# Patient Record
Sex: FEMALE | Race: BLACK OR AFRICAN AMERICAN
[De-identification: names, ages, dates, MRNs, and addresses within clinical notes are randomized per-mention and may not be internally consistent; named-entity substitution may affect disease eponyms.]

---

## 2017-10-05 ENCOUNTER — HOSPITAL ENCOUNTER (EMERGENCY)
Dept: HOSPITAL 17 - NEPK | Age: 27
Discharge: HOME | End: 2017-10-05
Payer: COMMERCIAL

## 2017-10-05 VITALS — BODY MASS INDEX: 41.02 KG/M2 | WEIGHT: 293 LBS | HEIGHT: 71 IN

## 2017-10-05 VITALS
TEMPERATURE: 98.4 F | RESPIRATION RATE: 16 BRPM | DIASTOLIC BLOOD PRESSURE: 97 MMHG | SYSTOLIC BLOOD PRESSURE: 143 MMHG | OXYGEN SATURATION: 97 % | HEART RATE: 105 BPM

## 2017-10-05 DIAGNOSIS — Z72.0: ICD-10-CM

## 2017-10-05 DIAGNOSIS — J20.9: Primary | ICD-10-CM

## 2017-10-05 DIAGNOSIS — H92.03: ICD-10-CM

## 2017-10-05 DIAGNOSIS — R07.89: ICD-10-CM

## 2017-10-05 PROCEDURE — 71020: CPT

## 2017-10-05 PROCEDURE — 99284 EMERGENCY DEPT VISIT MOD MDM: CPT

## 2017-10-05 PROCEDURE — 94664 DEMO&/EVAL PT USE INHALER: CPT

## 2017-10-05 NOTE — PD
HPI


Chief Complaint:  Cold / Flu Symptoms


Time Seen by Provider:  10:40


Travel History


International Travel<30 days:  No


Contact w/Intl Traveler<30days:  No


Traveled to known affect area:  No





History of Present Illness


HPI


27-year-old female presents to the emergency Department with complaint of cough

, throat irritation, nasal congestion, chest tightness since Saturday.  She was 

seen at [facility] on Monday and says she was told she had an 

upper respiratory infection and was negative for strep pharyngitis; she given 

cough medicine with codeine which is not helping her cough.  Denies fevers.  

Reports ear pain bilaterally.  Denies lump in throat, difficulty swallowing, 

unusual drooling.  Reports painful swallowing.  Reports continued consistent 

dry cough.  Denies chest pain or shortness of breath.  Says she feels short of 

breath or cough exacerbations.  Reports occasional wheezing.  Denies history of 

asthma.  Symptoms are mild in severity.    No known relieving or aggravating 

factors.   Allergies to blueberries.  Has no other medical complaints. No other 

modifying factors or associated signs and symptoms.





PFSH


Past Medical History


Diminished Hearing:  No


Pregnant?:  Not Pregnant





Social History


Alcohol Use:  No ([facility])


Tobacco Use:  Yes (1 PPD)


Substance Use:  No





Allergies-Medications


(Allergen,Severity, Reaction):  


Coded Allergies:  


     blueberry (Verified  Allergy, Severe, THROAT SWELLINGS SHUT, 10/5/17)


Reported Meds & Prescriptions





Reported Meds & Active Scripts


Active


Tessalon Perles (Benzonatate) 100 Mg Cap 100 Mg PO TID PRN


Deltasone (Prednisone) 20 Mg Tab 40 Mg PO DAILY 4 Days


     start 10/6/2017


Ventolin Hfa 18 GM Inh (Albuterol Sulfate) 90 Mcg/Act Aer 2 Puff INH Q4-6H PRN








Review of Systems


Except as stated in HPI:  all other systems reviewed are Neg





Physical Exam


Narrative


GENERAL: Well-nourished, well-developed black female patient, in no acute 

distress; afebrile, nontoxic-appearing


SKIN: Warm and dry.  No rash.


HEAD: Atraumatic. Normocephalic. 


EYES: Pupils equal and round. No scleral icterus. No injection or drainage. 


ENT: Mucosa pink and moist. No erythema or exudates. No uvular edema. No uvular

, palatal, or tonsillar deviation.  Airway patent.  


EARS: Bilateral pinnae and external canals appear within normal limits.  

Bilateral tympanic membranes without  erythema, dullness or perforation; 

bulging bilaterally. 


NECK: Trachea midline.  No lymphadenopathy.  


CARDIOVASCULAR: Regular rate and rhythm.  No murmur appreciated.  


RESPIRATORY: No accessory muscle use. Clear to auscultation. Breath sounds 

equal bilaterally.  No retractions or tachypnea.  Consistent dry cough.


GASTROINTESTINAL: Obese.


MUSCULOSKELETAL: No obvious deformities. No clubbing.  No cyanosis.  No edema. 


NEUROLOGICAL: Awake and alert.  Oriented 3.  No obvious cranial nerve 

deficits.  Motor grossly within normal limits. Normal speech.  Moves all 

extremities.  5/5 strength to all extremities.


PSYCHIATRIC: Appropriate mood and affect; insight and judgment normal.





Data


Data


Last Documented VS





Vital Signs








  Date Time  Temp Pulse Resp B/P (MAP) Pulse Ox O2 Delivery O2 Flow Rate FiO2


 


10/5/17 10:28 98.4 105 16 143/97 (112) 97   








Orders





 Orders


Chest, Pa & Lat (10/5/17 10:41)


Prednisone (Deltasone) (10/5/17 10:45)


Albuterol Neb (Albuterol Neb) (10/5/17 10:45)








MDM


Medical Decision Making


Medical Screen Exam Complete:  Yes


Emergency Medical Condition:  Yes


Medical Record Reviewed:  Yes


Differential Diagnosis


Bronchitis, pneumonia, URI


Narrative Course


27-year-old female with cough/cold symptoms since Saturday.  Was seen at [facility] on Monday and treated for viral URI with cough medicine with codeine.  

Per the patient she was swabbed for strep which was negative.  Patient is 

afebrile and nontoxic appearing.  She denies fever, vomiting.  She is in no 

acute distress without retractions or tachypnea.  Patient has consistent dry 

cough during physical exam.  Suspecting bronchitis.  Chest x-ray, albuterol 

nebulizer, Deltasone ordered.


1109:  Chest x-ray no acute disease.  Findings discussed with patient.


1139:  On reexamination after nebulizer the patient reports improvement in 

symptoms.  Denies chest tightness or shortness of breath.  Ventolin inhaler, 

Tessalon Perles, Deltasone prescribed for home.  Instructed patient to follow 

up with primary care provider.  Patient verbalizes understanding and agreement 

with treatment plan.  Patient is medically cleared and stable for discharge.  

Discussed reasons to return to the emergency department.  Patient agrees with 

treatment plan.  The patients vital signs are stable and the patient is stable 

for outpatient follow-up and treatment.  Patient discharged home, stable and in 

no acute distress.





Diagnosis





 Primary Impression:  


 Acute bronchitis


 Qualified Codes:  J20.9 - Acute bronchitis, unspecified


Referrals:  


Primary Care Physician


Patient Instructions:  Acute Bronchitis (ED), General Instructions


Departure Forms:  Tests/Procedures, Work Release   Enter return to work date:  

Oct 6, 2017





***Additional Instructions:  


Use Albuterol inhaler as prescribed


Take oral steroids as prescribed and complete full course


Use Tessalon Perles as prescribed to decrease coughing spasms


Over-the-counter decongestants or antihistamines as directed and as needed for 

symptom management


Your cough can last 4-6 weeks


Drink plenty of fluids to prevent dehydration


Use hot air humidifier to decrease cough exacerbation


Turn off ceiling fans and sleep with head of bed elevated


Avoid triggers such as second hand smoke, dust, known allergens


Follow-up with your primary care provider


Return to the emergency department immediately with worsening of symptoms


***Med/Other Pt SpecificInfo:  Prescription(s) given


Scripts


Benzonatate (Tessalon Perles) 100 Mg Cap


100 MG PO TID Y for COUGH, #10 CAP 0 Refills


   Prov: Juana Alejandro         10/5/17 


Prednisone (Deltasone) 20 Mg Tab


40 MG PO DAILY for 4 Days, #8 TAB 0 Refills


   start 10/6/2017


   Prov: Juana Alejandro         10/5/17 


Albuterol 18 GM Inh (Ventolin Hfa 18 GM Inh) 90 Mcg/Act Aer


2 PUFF INH Q4-6H Y for SOB/WHEEZING, #1 INHALER 0 Refills


   Prov: Juana Alejandro         10/5/17


Disposition:  01 DISCHARGE HOME


Condition:  Stable











Juana Alejandro Oct 5, 2017 10:41

## 2017-10-05 NOTE — RADRPT
EXAM DATE/TIME:  10/05/2017 11:04 

 

[facility] COMPARISON:     

No previous studies available for comparison.

 

                     

INDICATIONS :     

Cough and chest pain.

                     

 

MEDICAL HISTORY :            

Smoker.   

 

SURGICAL HISTORY :     

None.   

 

ENCOUNTER:     

Initial                                        

 

ACUITY:     

4 - 6 days      

 

PAIN SCORE:     

9/10

 

LOCATION:     

Bilateral chest 

 

FINDINGS:     

PA and lateral views of the chest demonstrate the lungs to be symmetrically aerated without evidence 
of mass, infiltrate or effusion.  The cardiomediastinal contours are unremarkable.  Osseous structure
s are intact.

 

CONCLUSION:     No acute disease.  

 

 

 

 Sathish Velasquez MD on October 05, 2017 at 10:59           

Board Certified Radiologist.

 This report was verified electronically.

## 2018-01-03 ENCOUNTER — HOSPITAL ENCOUNTER (OUTPATIENT)
Dept: HOSPITAL 17 - HPND | Age: 28
End: 2018-01-03
Payer: COMMERCIAL

## 2018-01-03 DIAGNOSIS — O99.211: Primary | ICD-10-CM

## 2018-01-03 DIAGNOSIS — Z36.82: ICD-10-CM

## 2018-01-03 PROCEDURE — 76813 OB US NUCHAL MEAS 1 GEST: CPT

## 2018-01-31 ENCOUNTER — HOSPITAL ENCOUNTER (EMERGENCY)
Dept: HOSPITAL 17 - PHED | Age: 28
Discharge: HOME | End: 2018-01-31
Payer: COMMERCIAL

## 2018-01-31 VITALS — BODY MASS INDEX: 38.06 KG/M2 | WEIGHT: 271.83 LBS | HEIGHT: 71 IN

## 2018-01-31 VITALS
HEART RATE: 112 BPM | RESPIRATION RATE: 18 BRPM | DIASTOLIC BLOOD PRESSURE: 78 MMHG | OXYGEN SATURATION: 99 % | TEMPERATURE: 98.3 F | SYSTOLIC BLOOD PRESSURE: 151 MMHG

## 2018-01-31 VITALS — SYSTOLIC BLOOD PRESSURE: 145 MMHG | DIASTOLIC BLOOD PRESSURE: 76 MMHG

## 2018-01-31 DIAGNOSIS — Z3A.17: ICD-10-CM

## 2018-01-31 DIAGNOSIS — L29.9: ICD-10-CM

## 2018-01-31 DIAGNOSIS — O26.892: Primary | ICD-10-CM

## 2018-01-31 LAB
ALBUMIN SERPL-MCNC: 3.1 GM/DL (ref 3.4–5)
ALP SERPL-CCNC: 52 U/L (ref 45–117)
ALT SERPL-CCNC: 16 U/L (ref 10–53)
AST SERPL-CCNC: 21 U/L (ref 15–37)
BILIRUB INDIRECT SERPL-MCNC: 0.1 MG/DL (ref 0–0.8)
BILIRUB SERPL-MCNC: 0.2 MG/DL (ref 0.2–1)
DIRECT BILIRUBIN ADULT: (no result) MG/DL (ref 0–0.2)
INR PPP: 1 RATIO
PROT SERPL-MCNC: 7.5 GM/DL (ref 6.4–8.2)
PROTHROMBIN TIME: 9.8 SEC (ref 9.8–11.6)

## 2018-01-31 PROCEDURE — 85610 PROTHROMBIN TIME: CPT

## 2018-01-31 PROCEDURE — 99284 EMERGENCY DEPT VISIT MOD MDM: CPT

## 2018-01-31 PROCEDURE — 85730 THROMBOPLASTIN TIME PARTIAL: CPT

## 2018-01-31 PROCEDURE — 82239 BILE ACIDS TOTAL: CPT

## 2018-01-31 PROCEDURE — 80076 HEPATIC FUNCTION PANEL: CPT

## 2018-01-31 NOTE — PD
HPI


Chief Complaint:  Skin Problem


Time Seen by Provider:  19:38


Travel History


International Travel<30 days:  No


Contact w/Intl Traveler<30days:  No


Traveled to known affect area:  No





History of Present Illness


HPI


26yo F with no PMH who is 17 weeks pregnant here with generalized itching for 1 

week.  Said it started in her feet bilaterally and now her entire body itches 

and worst today.  Denies any fever, cough, chest pain, sob, n/v, abdominal pain

, focal weakness or numbness, vaginal bleeding or any rash.





PFSH


Past Medical History


Medical History:  Denies Significant Hx


Diminished Hearing:  No


Tetanus Vaccination:  Unknown


Influenza Vaccination:  No


Pregnant?:  Pregnant


LMP:  10/1/2017


:  1


Para:  0





Past Surgical History


Surgical History:  No Previous Surgery





Social History


Alcohol Use:  No


Tobacco Use:  No


Substance Use:  No





Allergies-Medications


(Allergen,Severity, Reaction):  


Coded Allergies:  


     blueberry (Verified  Allergy, Severe, THROAT SWELLINGS SHUT, 10/5/17)


Reported Meds & Prescriptions





Reported Meds & Active Scripts


Active


Diphenhydramine (Diphenhydramine HCl) 25 Mg Cap 25 Mg PO Q6H PRN 5 Days


Tessalon Perles (Benzonatate) 100 Mg Cap 100 Mg PO TID PRN


Deltasone (Prednisone) 20 Mg Tab 40 Mg PO DAILY 4 Days


     start 10/6/2017


Ventolin Hfa 18 GM Inh (Albuterol Sulfate) 90 Mcg/Act Aer 2 Puff INH Q4-6H PRN








Review of Systems


Except as stated in HPI:  all other systems reviewed are Neg





Physical Exam


Narrative


GENERAL: 26yo F in mild distress.


SKIN: No distinct rash on skin.  No erythema or bullous.  No mucosal 

requirement.


HEAD: Atraumatic. Normocephalic. 


EYES: Pupils equal and round. No scleral icterus. No injection or drainage. 


ENT: No nasal bleeding or discharge.  Mucous membranes pink and moist.


NECK: Trachea midline. No JVD. 


CARDIOVASCULAR: Regular rate and rhythm.  No murmur appreciated.


RESPIRATORY: No accessory muscle use. Clear to auscultation. Breath sounds 

equal bilaterally. 


GASTROINTESTINAL: Abdomen soft, non-tender, nondistended. 


MUSCULOSKELETAL: No obvious deformities. No clubbing.  No cyanosis.  No edema. 


NEUROLOGICAL: Awake and alert. No obvious cranial nerve deficits.  Motor 

grossly within normal limits. Normal speech.


PSYCHIATRIC: Appropriate mood and affect; insight and judgment normal.





Data


Data


Last Documented VS





Vital Signs








  Date Time  Temp Pulse Resp B/P (MAP) Pulse Ox O2 Delivery O2 Flow Rate FiO2


 


18 21:39  91 15 145/76 (99) 100   


 


18 18:57 98.3       








Orders





 Orders


Hepatic Functional Panel (18 19:52)


Prothrombin Time / Inr (Pt) (18 19:52)


Act Partial Throm Time (Ptt) (18 19:52)


Diphenhydramine (Benadryl) (18 20:00)


Bile Acids Total (18 19:57)


Ed Poc Ultrasound (18 )


Ed Discharge Order (18 21:35)





Labs





Laboratory Tests








Test


  18


20:15


 


Prothrombin Time 9.8 SEC 


 


Prothromb Time International


Ratio 1.0 RATIO 


 


 


Activated Partial


Thromboplast Time 27.4 SEC 


 


 


Total Bilirubin 0.2 MG/DL 


 


Direct Bilirubin


  LESS THAN 0.1


MG/DL


 


Indirect Bilirubin 0.1 MG/DL 


 


Aspartate Amino Transf


(AST/SGOT) 21 U/L 


 


 


Alanine Aminotransferase


(ALT/SGPT) 16 U/L 


 


 


Alkaline Phosphatase 52 U/L 


 


Total Protein 7.5 GM/DL 


 


Albumin 3.1 GM/DL 











MDM


Medical Decision Making


Medical Screen Exam Complete:  Yes


Emergency Medical Condition:  Yes


Differential Diagnosis


Intrahepatic cholestasis of pregnancy vs. allergic reaction


Narrative Course


26yo F with generalized itching for 1 week.  She did started using a new soap.  

Will check liver enzymes, bilirubin to r/o intrahepatic cholestasis since pt 

has no rash and is 17 weeks pregnant.  LFTs normal.  Bilirubin normal.  Bile 

acid is a sent out.  Pt given diphenhydramine and her itching resolved.  No 

airway involvement.  Pt insist on me checking her baby even though she has no 

abdominal pain or vaginal bleeding.  Bedside US showed +Fetal movement and 

normal fetal heart rate.





Procedures


**Procedure Narrative**


Emergency Department Pelvic ultrasound was performed with patient consent.


The curvilinear probe was used in the transverse and sagittal views within the 

suprapubic region revealing single intrauterine pregnancy.  Fetal heart rate 

was 145bpm.





Diagnosis





 Primary Impression:  


 Itching


Patient Instructions:  General Instructions


Departure Forms:  Tests/Procedures





***Additional Instructions:  


Please follow up with your OBGYN in 2-3 days.  Stop using the new soap.  Return 

to the ED if symptoms worsen.


***Med/Other Pt SpecificInfo:  Prescription(s) given


Scripts


Diphenhydramine (Diphenhydramine) 25 Mg Cap


25 MG PO Q6H Y for ITCHING for 5 Days, #20 CAP 0 Refills


   Prov: Kiersten Aguirre DO         18


Disposition:  01 DISCHARGE HOME


Condition:  Stable











Kiersten Aguirre DO 2018 19:57

## 2018-02-14 ENCOUNTER — HOSPITAL ENCOUNTER (OUTPATIENT)
Dept: HOSPITAL 17 - HPND | Age: 28
End: 2018-02-14
Payer: COMMERCIAL

## 2018-02-14 DIAGNOSIS — O99.89: Primary | ICD-10-CM

## 2018-02-14 DIAGNOSIS — E66.01: ICD-10-CM

## 2018-02-14 DIAGNOSIS — O99.211: ICD-10-CM

## 2018-02-14 PROCEDURE — 76811 OB US DETAILED SNGL FETUS: CPT

## 2018-03-23 ENCOUNTER — HOSPITAL ENCOUNTER (OUTPATIENT)
Dept: HOSPITAL 17 - HPND | Age: 28
End: 2018-03-23
Payer: COMMERCIAL

## 2018-03-23 DIAGNOSIS — O99.89: ICD-10-CM

## 2018-03-23 DIAGNOSIS — O99.212: Primary | ICD-10-CM

## 2018-03-23 DIAGNOSIS — E66.01: ICD-10-CM

## 2018-03-23 PROCEDURE — 76816 OB US FOLLOW-UP PER FETUS: CPT

## 2018-04-05 ENCOUNTER — HOSPITAL ENCOUNTER (EMERGENCY)
Dept: HOSPITAL 17 - HOBED | Age: 28
Discharge: HOME | End: 2018-04-05
Payer: COMMERCIAL

## 2018-04-05 ENCOUNTER — HOSPITAL ENCOUNTER (OUTPATIENT)
Dept: HOSPITAL 17 - HPND | Age: 28
End: 2018-04-05
Payer: COMMERCIAL

## 2018-04-05 VITALS — HEIGHT: 71 IN | BODY MASS INDEX: 37.8 KG/M2 | WEIGHT: 270 LBS

## 2018-04-05 DIAGNOSIS — Z3A.26: ICD-10-CM

## 2018-04-05 DIAGNOSIS — E66.01: ICD-10-CM

## 2018-04-05 DIAGNOSIS — O46.92: Primary | ICD-10-CM

## 2018-04-05 DIAGNOSIS — O99.212: Primary | ICD-10-CM

## 2018-04-05 PROCEDURE — 76815 OB US LIMITED FETUS(S): CPT

## 2018-04-05 PROCEDURE — 99284 EMERGENCY DEPT VISIT MOD MDM: CPT

## 2018-04-05 PROCEDURE — 76817 TRANSVAGINAL US OBSTETRIC: CPT

## 2018-04-05 NOTE — PD
HPI


Chief Complaint


Cramping


Date Seen:  2018


Time Seen:  02:58


Travel History


International Travel<30 Days:  No


Contact w/Intl Traveler<30Days:  No


Known Affected Area:  No





History of Present Illness


HPI


27-year-old  who is 26 weeks 4 days comes in complaining of cramping 

started at 1:00 in the morning that has since resolved.  Patient also noted 

that she had a small amount of discharge on toilet paper after she voided last 

evening.  She thought it might be coming from the rectum and she had a 

difficult bowel movement last evening.  Patient said good fetal movement and no 

episodes of  labor this pregnancy.  Denies any antepartum complications 

and is seeing Dr. Dover consistently for her prenatal care.  Last ultrasound 

noted a posterior placenta.


Weeks Gestation:  26 (26.4)


Para:  0


:  1





History


Past Medical History


Medical History:  Denies Significant Hx





Past Surgical History


Surgical History:  No Previous Surgery





Family History


Family History:  Negative





Social History


Alcohol Use:  No


Tobacco Use:  No


Substance Abuse:  No





Allergies-Medications


(Allergen,Severity, Reaction):  


Coded Allergies:  


     blueberry (Verified  Allergy, Severe, THROAT SWELLINGS SHUT, 18)


     tramadol (Verified  Allergy, Severe, Diarrhea, 18)


Home Meds


Reported Medications


Prenatal Vit,Calc76/Iron/Folic (Pnv 29-1 Tablet) 29 Mg Iron-1 Mg Tablet, 1 TAB 

PO BID


   18


Sertraline (Zoloft) 25 Mg Tab, 25 MG PO DAILY, #30 TAB 0 Refills


   18


Discontinued Scripts


Diphenhydramine (Diphenhydramine) 25 Mg Cap, 25 MG PO Q6H Y for ITCHING for 5 

Days, #20 CAP 0 Refills


   Prov:Kiersten Aguirre DO         18


Benzonatate (Tessalon Perles) 100 Mg Cap, 100 MG PO TID Y for COUGH, #10 CAP 0 

Refills


   Prov:Juana Alejandro ARNP         10/5/17


Prednisone (Deltasone) 20 Mg Tab, 40 MG PO DAILY for 4 Days, #8 TAB 0 Refills


   start 10/6/2017


   Prov:Juana Alejandro ARNP         10/5/17


Albuterol 18 GM Inh (Ventolin Hfa 18 GM Inh) 90 Mcg/Act Aer, 2 PUFF INH Q4-6H Y 

for SOB/WHEEZING, #1 INHALER 0 Refills


   Prov:Juana Alejandro ARNP         10/5/17





Review of Systems


Except as stated in HPI:  all other systems reviewed are Neg





Physical Exam


Narrative


GENERAL: Well-nourished, well-developed patient.


SKIN: Warm and dry.


HEAD: Normocephalic and atraumatic.


EYES: No scleral icterus. No injection or drainage. 


ENT: No nasal drainage noted. Mucous membranes pink. Airway patent.


NECK: Supple, trachea midline. No JVD.


CARDIOVASCULAR: Regular rate and rhythm without murmurs, gallops, or rubs. 


RESPIRATORY: Breath sounds equal bilaterally. No accessory muscle use.


ABDOMEN/GI: Abdomen soft, non-tender, bowel sounds present, no rebound, no 

guarding 


   Gravid to [-] weeks size 29


   Fundal Height: [-]


GENITOURINARY: Initial exam was done with speculum and a small amount of pink 

discharge in the posterior vagina.  


   External Genitalia: intact and normal in appearance


   BUS glands: [-] Normal


   Cervix: [-] Posterior


   Dilatation: [-] Closed         


   Effacement: [-] Long         


   Station: [-] high


   Presentation: [-] Vertex       


   Membranes: [intact or ruptured] intact


   Uterine Contractions: [-] Absent


FHT's: 


   Category: [-] 1


   Baseline: [-] 140


   Reactive: [-] Moderate


   Variability: [-] Moderate


   Decels: [-] Absent


EXTREMITIES: No cyanosis or edema.


BACK: Nontender without obvious deformity. No CVA tenderness.


NEUROLOGICAL: Awake and alert. Motor and sensory grossly within normal limits. 

Five out of 5 muscle strength in all muscle groups. Normal speech.





Data


Data


Vital Signs Reviewed:  Yes





[facility]


Medical Record Reviewed:  Yes


Plan


27-year-old  who is at 26 weeks 4 days with vaginal spotting, no 

definitive bleeding is noted on the examination either vaginally or rectally 

although she does have 2 slightly inflamed hemorrhoids rectally


Patient has no contractions are palpable or visible, she may consider cervical 

length testing


Follow with Dr. Dover's office tomorrow


Diagnosis


Diagnosis:  


 Primary Impression:  


 26 weeks gestation of pregnancy


 Additional Impression:  


 Vaginal bleeding during pregnancy, antepartum


Disposition:  01 DISCHARGE HOME











Clarice Valles MD 2018 03:05

## 2018-04-07 ENCOUNTER — HOSPITAL ENCOUNTER (EMERGENCY)
Dept: HOSPITAL 17 - HOBED | Age: 28
Discharge: HOME | End: 2018-04-07
Payer: COMMERCIAL

## 2018-04-07 DIAGNOSIS — Z3A.26: ICD-10-CM

## 2018-04-07 DIAGNOSIS — O26.852: Primary | ICD-10-CM

## 2018-04-07 PROCEDURE — 99284 EMERGENCY DEPT VISIT MOD MDM: CPT

## 2018-04-07 NOTE — PD
HPI


Chief Complaint


Spotting


Date Seen:  Apr 7, 2018


Time Seen:  02:56


Travel History


International Travel<30 Days:  No


Contact w/Intl Traveler<30Days:  No


Known Affected Area:  No





History of Present Illness


HPI


27-year-old primigravida at 26 weeks gestation who returns tonight for 

spotting.  She has been evaluated the last 2 days for this complaint including 

an [facility] at [facility] which did not reveal any evidence of fracture 

chorionic or retroplacental hematoma and a normal cervical length of 42 mm.


No leakage of fluid or pain.





History


Past Medical History


Medical History:  Denies Significant Hx





Obstetric History


Obstetric History


Primigravida at 26+ weeks gestation, current prenatal care with Dr. milton





Past Surgical History


Surgical History:  No Previous Surgery





Family History


Family History:  Negative





Social History


Alcohol Use:  No


Tobacco Use:  No


Substance Abuse:  No





Allergies-Medications


(Allergen,Severity, Reaction):  


Coded Allergies:  


     blueberry (Verified  Allergy, Severe, THROAT SWELLINGS SHUT, 4/5/18)


     tramadol (Verified  Allergy, Severe, Diarrhea, 4/5/18)


Home Meds


Reported Medications


Prenatal Vit,Calc76/Iron/Folic (Pnv 29-1 Tablet) 29 Mg Iron-1 Mg Tablet, 1 TAB 

PO BID


   4/5/18


Sertraline (Zoloft) 25 Mg Tab, 25 MG PO DAILY, #30 TAB 0 Refills


   4/5/18


Discontinued Scripts


Diphenhydramine (Diphenhydramine) 25 Mg Cap, 25 MG PO Q6H Y for ITCHING for 5 

Days, #20 CAP 0 Refills


   Prov:Kiersten Aguirre DO         1/31/18


Benzonatate (Tessalon Perles) 100 Mg Cap, 100 MG PO TID Y for COUGH, #10 CAP 0 

Refills


   Prov:Juana Alejandro ARNP         10/5/17


Prednisone (Deltasone) 20 Mg Tab, 40 MG PO DAILY for 4 Days, #8 TAB 0 Refills


   start 10/6/2017


   Prov:Juana Alejandro ARNP         10/5/17


Albuterol 18 GM Inh (Ventolin Hfa 18 GM Inh) 90 Mcg/Act Aer, 2 PUFF INH Q4-6H Y 

for SOB/WHEEZING, #1 INHALER 0 Refills


   Prov:Juana Alejandro ARNP         10/5/17





Review of Systems


Except as stated in HPI:  all other systems reviewed are Neg





Physical Exam


Narrative


GENERAL: Well-nourished, well-developed patient.


SKIN: Warm and dry.


HEAD: Normocephalic and atraumatic.


EYES: No scleral icterus. No injection or drainage. 


ENT: No nasal drainage noted. Mucous membranes pink. Airway patent.


NECK: Supple, trachea midline. No JVD.


ABDOMEN/GI: Abdomen soft, non-tender, bowel sounds present, no rebound, no 

guarding 


   Gravid to [-] weeks size


   Fundal Height: [-]


GENITOURINARY: 


   External Genitalia: intact and normal in appearance


   BUS glands: [Negative-]


   Cervix: [Small area at the 6 o'clock position on the ectocervix with 

pinpoint bleeding-]


   Dilatation: [Closed]          


   Effacement: [Long-]          


   Station: [-High]  


   Presentation: [-]        


   Membranes: [intact ]


   Uterine Contractions: [-No]


FHT's: 


   Category: [-]   


   Baseline: [-140]   


   Reactive: [-]   


   Variability: [-]  


   Decels: [-]  


EXTREMITIES: No cyanosis or edema.


BACK: Nontender without obvious deformity. No CVA tenderness.


NEUROLOGICAL: Awake and alert. Motor and sensory grossly within normal limits. 

Five out of 5 muscle strength in all muscle groups. Normal speech.





Data


Data


Vital Signs Reviewed:  Yes





MDM


Medical Record Reviewed:  Yes


Narrative Course / MDM


Assessment: 26 week intrauterine pregnancy with a small area of bleeding on the 

ectocervix without evidence of inflammation or infection





Plan: Reassurance was given to the patient regarding the source of the 

bleeding.  She will return to routine care.


Diagnosis


Diagnosis:  


 Primary Impression:  


 26 weeks gestation of pregnancy


 Additional Impression:  


 Vaginal bleeding


Disposition:  01 DISCHARGE HOME


Condition:  Good


Patient Instructions:  General Instructions


Departure Forms:  Tests/Procedures











Tristin Harley MD Apr 7, 2018 03:01

## 2018-04-30 ENCOUNTER — HOSPITAL ENCOUNTER (OUTPATIENT)
Dept: HOSPITAL 17 - HPND | Age: 28
End: 2018-04-30
Payer: COMMERCIAL

## 2018-04-30 DIAGNOSIS — E66.01: ICD-10-CM

## 2018-04-30 DIAGNOSIS — O26.842: Primary | ICD-10-CM

## 2018-04-30 DIAGNOSIS — O99.212: ICD-10-CM

## 2018-04-30 PROCEDURE — 76816 OB US FOLLOW-UP PER FETUS: CPT

## 2018-05-29 ENCOUNTER — HOSPITAL ENCOUNTER (OUTPATIENT)
Dept: HOSPITAL 17 - HPND | Age: 28
End: 2018-05-29
Payer: COMMERCIAL

## 2018-05-29 DIAGNOSIS — E66.01: ICD-10-CM

## 2018-05-29 DIAGNOSIS — O99.213: Primary | ICD-10-CM

## 2018-05-29 PROCEDURE — 76816 OB US FOLLOW-UP PER FETUS: CPT
